# Patient Record
Sex: FEMALE
[De-identification: names, ages, dates, MRNs, and addresses within clinical notes are randomized per-mention and may not be internally consistent; named-entity substitution may affect disease eponyms.]

---

## 2018-09-23 ENCOUNTER — HOSPITAL ENCOUNTER (EMERGENCY)
Dept: HOSPITAL 42 - ED | Age: 43
Discharge: HOME | End: 2018-09-23
Payer: SELF-PAY

## 2018-09-23 VITALS
DIASTOLIC BLOOD PRESSURE: 65 MMHG | HEART RATE: 60 BPM | OXYGEN SATURATION: 98 % | SYSTOLIC BLOOD PRESSURE: 135 MMHG | TEMPERATURE: 97.6 F

## 2018-09-23 VITALS — RESPIRATION RATE: 18 BRPM

## 2018-09-23 DIAGNOSIS — G43.909: Primary | ICD-10-CM

## 2018-09-23 PROCEDURE — 96375 TX/PRO/DX INJ NEW DRUG ADDON: CPT

## 2018-09-23 PROCEDURE — 99285 EMERGENCY DEPT VISIT HI MDM: CPT

## 2018-09-23 PROCEDURE — 70450 CT HEAD/BRAIN W/O DYE: CPT

## 2018-09-23 PROCEDURE — 82948 REAGENT STRIP/BLOOD GLUCOSE: CPT

## 2018-09-23 PROCEDURE — 96374 THER/PROPH/DIAG INJ IV PUSH: CPT

## 2018-09-23 NOTE — CT
Date of service: 



09/23/2018



PROCEDURE:  CT HEAD WITHOUT CONTRAST.



HISTORY:

headache



COMPARISON:

None available.



TECHNIQUE:

Axial computed tomography images were obtained through the head/brain 

without intravenous contrast.  



Radiation dose:



Total exam DLP = 743.61 mGy-cm.



This CT exam was performed using one or more of the following dose 

reduction techniques: Automated exposure control, adjustment of the 

mA and/or kV according to patient size, and/or use of iterative 

reconstruction technique.



FINDINGS:



HEMORRHAGE:

No intracranial hemorrhage. 



BRAIN:

No mass effect or edema.  No atrophy or chronic microvascular 

ischemic changes.



VENTRICLES:

Unremarkable. No hydrocephalus. 



CALVARIUM:

Unremarkable.



PARANASAL SINUSES:

Unremarkable as visualized. No significant inflammatory changes.



MASTOID AIR CELLS:

Unremarkable as visualized. No inflammatory changes.



OTHER FINDINGS:

None.



IMPRESSION:

Normal CT of the Head.  No intracranial mass, hemorrhage or evidence 

of acute infarct.



The preliminary findings for this examination were reported by 

[facility] at 2:30 a.m. on 9/23/2018.  There is concurrence 

of this report with the preliminary findings.

## 2018-09-23 NOTE — ED PDOC
Arrival/HPI





- General


Chief Complaint: Headache


Time Seen by Provider: 09/23/18 00:39


Historian: Patient





- History of Present Illness


Narrative History of Present Illness (Text): 





09/23/18 01:00


42 year old female, with no significant past medical history, presents to the 

emergency department complaining of sudden onset of headache discomfort 30 

minutes prior to arrival associated with some nausea. Patient describes it as a 

throbbing sensation to the front top of the head. Patient states she has a 

history of headaches but this appears slightly different. Patient denies any 

fever, chills, chest pain, shortness of breath, vomiting, diarrhea, urinary 

symptoms, back pain, neck pain, dizziness, or any other complaints. 


Time/Duration: Prior to Arrival (30 minutes)


Symptom Onset: Sudden


Symptom Course: Unchanged


Quality: Throbbing


Activities at Onset: Light


Context: Home





Past Medical History





- Provider Review


Nursing Documentation Reviewed: Yes





- Infectious Disease


Hx of Infectious Diseases: None





- Reproductive


Menopause: Yes (pre menopause)





- Psychiatric


Hx Substance Use: No





- Anesthesia


Hx Anesthesia: No





Family/Social History





- Physician Review


Nursing Documentation Reviewed: Yes


Family/Social History: No Known Family HX


Smoking Status: Never Smoked


Hx Alcohol Use: No


Hx Substance Use: No





Allergies/Home Meds


Allergies/Adverse Reactions: 


Allergies





No Known Allergies Allergy (Verified 09/23/18 00:53)


 











Review of Systems





- Physician Review


All systems were reviewed & negative as marked: Yes





- Review of Systems


Constitutional: absent: Fevers, Other (Chills)


Respiratory: absent: SOB


Cardiovascular: absent: Chest Pain


Gastrointestinal: Nausea.  absent: Diarrhea, Vomiting


Genitourinary Female: absent: Dysuria, Frequency, Hematuria


Musculoskeletal: absent: Back Pain, Neck Pain


Neurological: Headache.  absent: Dizziness





Physical Exam


Vital Signs Reviewed: Yes


Vital Signs











  Temp Pulse Resp BP Pulse Ox


 


 09/23/18 00:46  97.8 F  70  18  118/70  99











Temperature: Afebrile


Blood Pressure: Normal


Pulse: Regular


Respiratory Rate: Normal


Appearance: Positive for: Well-Appearing, Non-Toxic, Comfortable


Pain Distress: None


Mental Status: Positive for: Alert and Oriented X 3





- Systems Exam


Head: Present: Atraumatic, Normocephalic


Pupils: Present: PERRL


Extroacular Muscles: Present: EOMI


Conjunctiva: Present: Normal


Ears: Present: Normal


Mouth: Present: Moist Mucous Membranes


Pharnyx: Present: Normal


Nose (Internal): Present: Normal Inspection


Neck: Present: Normal Range of Motion.  No: Meningeal Signs


Respiratory/Chest: Present: Clear to Auscultation, Good Air Exchange.  No: 

Respiratory Distress, Accessory Muscle Use


Cardiovascular: Present: Regular Rate and Rhythm, Normal S1, S2.  No: Murmurs


Abdomen: No: Tenderness, Distention, Peritoneal Signs


Back: Present: Normal Inspection


Upper Extremity: Present: Normal Inspection.  No: Cyanosis, Edema


Lower Extremity: Present: Normal Inspection.  No: Edema


Neurological: Present: GCS=15, CN II-XII Intact, Speech Normal, Motor Func 

Grossly Intact, Normal Sensory Function


Skin: Present: Warm, Dry, Normal Color.  No: Rashes


Psychiatric: Present: Alert, Oriented x 3, Normal Insight, Normal Concentration





Medical Decision Making


ED Course and Treatment: 





09/23/18 01:00


Impression:


42 year old female presents complaining of sudden onset of headache discomfort 

30 minutes prior to arrival associated with nausea. 





Plan:


-- CT Head w/o Contrast 


-- Benadryl, Reglan, IV Fluids. 


-- Reassess and disposition








Progress Notes:





EXAM:CT Head Without Intravenous Contrast


Dictated and Authenticated by: Jacqui Garner MD


09/23/2018 2:30 AM


IMPRESSION:


No acute findings.





09/23/18 02:55


On re-evaluation, patient feels better and is in no acute distress. I have 

discussed the results and plan with the patient, who expresses understanding. 

Patient in agreement with plan to be discharged home. Patient is stable for 

discharge. Patient was instructed to follow up with physician or return if 

symptoms worsen or new concerning symptoms arise. 








- Lab Interpretations


Lab Results: 


 Lab Results





09/23/18 02:05: POC Glucose (mg/dL) 90











- RAD Interpretation


Radiology Orders: 








09/23/18 01:01


HEAD W/O CONTRAST [CT] Stat 














- Medication Orders


Current Medication Orders: 











Discontinued Medications





Diphenhydramine HCl (Benadryl)  25 mg IVP ONCE ONE


   Stop: 09/23/18 01:03


   Last Admin: 09/23/18 01:16  Dose: 25 mg





IVP Administration


 Document     09/23/18 01:16  NAA  (Rec: 09/23/18 01:17  NAA  [id number])


     Charges for Administration


      # of IVP Administrations                   1





Sodium Chloride (Sodium Chloride 0.9%)  1,000 mls @ 999 mls/hr IV .Q1H1M STA


   Stop: 09/23/18 02:02


   Last Admin: 09/23/18 01:17  Dose: 999 mls/hr





eMAR Start Stop


 Document     09/23/18 01:17  RG  (Rec: 09/23/18 01:17  RG  [id number])


     Intravenous Solution


      Start Date                                 09/23/18


      Start Time                                 01:17





Metoclopramide HCl (Reglan)  10 mg IVP ONCE ONE


   Stop: 09/23/18 01:03


   Last Admin: 09/23/18 01:16  Dose: 10 mg





IVP Administration


 Document     09/23/18 01:16  RG  (Rec: 09/23/18 01:16  RG  [id number])


     Charges for Administration


      # of IVP Administrations                   1














- Scribe Statement


The provider has reviewed the documentation as recorded by the Scribe





Fabienne Galvan





Provider Scribe Attestation:


All medical record entries made by the Scribe were at my direction and 

personally dictated by me. I have reviewed the chart and agree that the record 

accurately reflects my personal performance of the history, physical exam, 

medical decision making, and the department course for this patient. I have 

also personally directed, reviewed, and agree with the discharge instructions 

and disposition.








Disposition/Present on Arrival





- Present on Arrival


Any Indicators Present on Arrival: No


History of DVT/PE: No


History of Uncontrolled Diabetes: No


Urinary Catheter: No


History of Decub. Ulcer: No


History Surgical Site Infection Following: None





- Disposition


Have Diagnosis and Disposition been Completed?: Yes


Diagnosis: 


 Migraine headache





Disposition: HOME/ ROUTINE


Disposition Time: 02:59


Patient Plan: Discharge


Condition: GOOD


Discharge Instructions (ExitCare):  Migraine Headache (DC)


Additional Instructions: 


Take meds as prescribed/follow up with your doctor this week


Prescriptions: 


Acetaminophen/Butalbital/Caf [Fioricet] 1 tab PO Q6 PRN #16 tab


 PRN Reason: Headache


Forms:  [facility] (English)